# Patient Record
Sex: FEMALE | Race: OTHER
[De-identification: names, ages, dates, MRNs, and addresses within clinical notes are randomized per-mention and may not be internally consistent; named-entity substitution may affect disease eponyms.]

---

## 2020-06-11 ENCOUNTER — HOSPITAL ENCOUNTER (INPATIENT)
Dept: HOSPITAL 72 - EMR | Age: 35
LOS: 4 days | Discharge: HOME | DRG: 948 | End: 2020-06-15
Payer: COMMERCIAL

## 2020-06-11 VITALS — DIASTOLIC BLOOD PRESSURE: 70 MMHG | SYSTOLIC BLOOD PRESSURE: 113 MMHG

## 2020-06-11 VITALS — HEIGHT: 59 IN | WEIGHT: 110 LBS | BODY MASS INDEX: 22.18 KG/M2

## 2020-06-11 VITALS — DIASTOLIC BLOOD PRESSURE: 66 MMHG | SYSTOLIC BLOOD PRESSURE: 108 MMHG

## 2020-06-11 DIAGNOSIS — R00.1: ICD-10-CM

## 2020-06-11 DIAGNOSIS — R21: ICD-10-CM

## 2020-06-11 DIAGNOSIS — D64.9: ICD-10-CM

## 2020-06-11 DIAGNOSIS — G89.18: Primary | ICD-10-CM

## 2020-06-11 DIAGNOSIS — D25.9: ICD-10-CM

## 2020-06-11 DIAGNOSIS — L29.9: ICD-10-CM

## 2020-06-11 LAB
ADD MANUAL DIFF: NO
ALBUMIN SERPL-MCNC: 2.9 G/DL (ref 3.4–5)
ALBUMIN/GLOB SERPL: 0.9 {RATIO} (ref 1–2.7)
ALP SERPL-CCNC: 42 U/L (ref 46–116)
ALT SERPL-CCNC: 14 U/L (ref 12–78)
ANION GAP SERPL CALC-SCNC: 11 MMOL/L (ref 5–15)
APPEARANCE UR: CLEAR
APTT PPP: (no result) S
AST SERPL-CCNC: 14 U/L (ref 15–37)
BASOPHILS NFR BLD AUTO: 0.3 % (ref 0–2)
BILIRUB SERPL-MCNC: 0.6 MG/DL (ref 0.2–1)
BUN SERPL-MCNC: 5 MG/DL (ref 7–18)
CALCIUM SERPL-MCNC: 7.4 MG/DL (ref 8.5–10.1)
CHLORIDE SERPL-SCNC: 104 MMOL/L (ref 98–107)
CO2 SERPL-SCNC: 22 MMOL/L (ref 21–32)
CREAT SERPL-MCNC: 0.9 MG/DL (ref 0.55–1.3)
EOSINOPHIL NFR BLD AUTO: 0 % (ref 0–3)
ERYTHROCYTE [DISTWIDTH] IN BLOOD BY AUTOMATED COUNT: 13.6 % (ref 11.6–14.8)
GLOBULIN SER-MCNC: 3.1 G/DL
GLUCOSE UR STRIP-MCNC: NEGATIVE MG/DL
HCT VFR BLD CALC: 34.9 % (ref 37–47)
HGB BLD-MCNC: 10.9 G/DL (ref 12–16)
KETONES UR QL STRIP: (no result)
LEUKOCYTE ESTERASE UR QL STRIP: NEGATIVE
LYMPHOCYTES NFR BLD AUTO: 7.9 % (ref 20–45)
MCV RBC AUTO: 86 FL (ref 80–99)
MONOCYTES NFR BLD AUTO: 1.4 % (ref 1–10)
NEUTROPHILS NFR BLD AUTO: 90.4 % (ref 45–75)
NITRITE UR QL STRIP: NEGATIVE
PH UR STRIP: 7 [PH] (ref 4.5–8)
PLATELET # BLD: 251 K/UL (ref 150–450)
POTASSIUM SERPL-SCNC: 4 MMOL/L (ref 3.5–5.1)
PROT UR QL STRIP: NEGATIVE
RBC # BLD AUTO: 4.06 M/UL (ref 4.2–5.4)
SODIUM SERPL-SCNC: 136 MMOL/L (ref 136–145)
SP GR UR STRIP: 1.01 (ref 1–1.03)
UROBILINOGEN UR-MCNC: NORMAL MG/DL (ref 0–1)
WBC # BLD AUTO: 10.2 K/UL (ref 4.8–10.8)

## 2020-06-11 PROCEDURE — 81025 URINE PREGNANCY TEST: CPT

## 2020-06-11 PROCEDURE — 99285 EMERGENCY DEPT VISIT HI MDM: CPT

## 2020-06-11 PROCEDURE — 83690 ASSAY OF LIPASE: CPT

## 2020-06-11 PROCEDURE — 80053 COMPREHEN METABOLIC PANEL: CPT

## 2020-06-11 PROCEDURE — 96374 THER/PROPH/DIAG INJ IV PUSH: CPT

## 2020-06-11 PROCEDURE — 80048 BASIC METABOLIC PNL TOTAL CA: CPT

## 2020-06-11 PROCEDURE — 85025 COMPLETE CBC W/AUTO DIFF WBC: CPT

## 2020-06-11 PROCEDURE — 96375 TX/PRO/DX INJ NEW DRUG ADDON: CPT

## 2020-06-11 PROCEDURE — 96361 HYDRATE IV INFUSION ADD-ON: CPT

## 2020-06-11 PROCEDURE — 36415 COLL VENOUS BLD VENIPUNCTURE: CPT

## 2020-06-11 PROCEDURE — 82962 GLUCOSE BLOOD TEST: CPT

## 2020-06-11 PROCEDURE — 81003 URINALYSIS AUTO W/O SCOPE: CPT

## 2020-06-11 RX ADMIN — SODIUM CHLORIDE SCH MLS/HR: 900 INJECTION, SOLUTION INTRAVENOUS at 23:30

## 2020-06-11 NOTE — NUR
ED Nurse Note:



Recieved pt from surgery center, pt had removal of uterine fibroids and here 
for uncontrolled post-op pain, pt states pain at 10/10, tp took oral oxycodone 
and states made her feel worse, pt denies cp, sob, or any other complaints or 
discomforts, pt is ambualtory, has patent irwin and saline lock to left ac 
area, assessed and intact and patent, pt immeidately palced on cardiac 
monitoring, will resume care as ordered and closely monitor.

## 2020-06-11 NOTE — EMERGENCY ROOM REPORT
History of Present Illness


General


Chief Complaint:  Lower Back Pain or Injury


Source:  Patient





Present Illness


HPI


Disclaimer: Please note that this report is being documented using [employer] 

technology. This can lead to erroneous entry secondary to incorrect 

interpretation by the dictating instrument.





HPI: 35-year-old female history of uterine fibroids presents with postoperative 

pain.  She had a uterine artery ablation this morning with Dr. Alvarado.  After 

surgery patient had persistent pain and was sent to the ER for pain control.  

She denies any fevers nausea or vomiting.  Eyes any other medical history.  

Pain is in the lower abdomen radiating to the back approximately 8 out of 10.


 


PMH: Uterine fibroids


 


PSH: Reviewed


 


 Social Hx: Denies smoking drinking or illicit drug use


Allergies:  


Coded Allergies:  


     No Known Allergies (Unverified , 20)





COVID-19 Screening


Contact w/high risk pt:  No


Recent Travel to affected area:  No


Experienced COVID-19 symptoms?:  No


COVID-19 Testing performed PTA:  No - tested on 


COVID-19 Screening:  Negative COVID-19


COVID-19 Testing Source:  pre op





Patient History


Last Menstrual Period:  20


Pregnant Now:  No


:  0


Para:  0


Reviewed Nursing Documentation:  PMH: Agreed; PSxH: Agreed





Nursing Documentation-PMH


Past Medical History:  No Stated History





Review of Systems


All Other Systems:  negative except mentioned in HPI





Physical Exam





Vital Signs








  Date Time  Temp Pulse Resp B/P (MAP) Pulse Ox O2 Delivery O2 Flow Rate FiO2


 


20 19:07 99.1 53 20 101/63 (76) 100 Room Air  








Sp02 EP Interpretation:  reviewed, normal


General Appearance:  well appearing, no apparent distress


Head:  normocephalic, atraumatic


Eyes:  bilateral eye PERRL, bilateral eye EOMI


ENT:  hearing grossly normal, moist mucus membranes


Neck:  full range of motion, supple


Respiratory:  lungs clear, normal breath sounds, no rhonchi, no respiratory 

distress, no retraction, no wheezing


Cardiovascular #1:  normal peripheral pulses, regular rate, rhythm, no murmur


Gastrointestinal:  soft, non-distended, no guarding, tenderness - Lower 

abdominal tenderness without rebound or guarding


Neurologic:  alert, oriented x3, no focal defects


Skin:  normal color, warm/dry





Medical Decision Making


Diagnostic Impression:  


 Primary Impression:  


 Postoperative pain


 Additional Impression:  


 Uterine fibroid


ER Course


MDM: Differential included postoperative pain, uterine fibroids, less likely 

infectious process





Clinical course-IV already established at surgical center, IV fluids started, 

pain control given, basic laboratory studies are sent, will arrange for patient 

to be admitted to the medical floor for pain control and observation.





On reevaluation: Pain controlled patient sleeping comfortably








Plan-patient will be placed on observation on the medical floor





Last Vital Signs








  Date Time  Temp Pulse Resp B/P (MAP) Pulse Ox O2 Delivery O2 Flow Rate FiO2


 


20 19:07 99.1 53 20 101/63 (76) 100 Room Air  








Disposition:  ADMITTED AS INPATIENT


Condition:  Serious


Referrals:  


NOT CHOSEN IPA/MD,REFERRING (PCP)











Slim Mera M.D. 2020 19:31

## 2020-06-11 NOTE — NUR
ED Nurse Note:



Pt has hospital room for admission, pt is awake, alert and oriented x 4, 
resting quietly but when asked states remains in severe pain, pt appears drowsy 
with eyes low, does appear to be sedated, report called to floor nurse 
Jessica pt belonging list completed, med rec also, IV site patent, pt 
post-op site with no bleeding noted, all pulses are present, irwin to gravity 
with clear urine, pt being taken to floor via gurney with ER-Tech, nad noted 
during pt transport.

## 2020-06-11 NOTE — NUR
ED Nurse Note:



Pt resting in bed, appears to be sleeping, arouses easily to verbal stimuli, 
when awakened pt continues to c/o severe pain and asking for meds, MD aware, 
new order recieved, will re-medicate as ordered and closely monitor, pt waiting 
for room for admission. V/S stable, no SOB or labored breathing, will also 
prepare for hospital admission.

## 2020-06-12 VITALS — SYSTOLIC BLOOD PRESSURE: 99 MMHG | DIASTOLIC BLOOD PRESSURE: 61 MMHG

## 2020-06-12 VITALS — DIASTOLIC BLOOD PRESSURE: 63 MMHG | SYSTOLIC BLOOD PRESSURE: 94 MMHG

## 2020-06-12 VITALS — SYSTOLIC BLOOD PRESSURE: 100 MMHG | DIASTOLIC BLOOD PRESSURE: 57 MMHG

## 2020-06-12 VITALS — SYSTOLIC BLOOD PRESSURE: 96 MMHG | DIASTOLIC BLOOD PRESSURE: 52 MMHG

## 2020-06-12 VITALS — SYSTOLIC BLOOD PRESSURE: 114 MMHG | DIASTOLIC BLOOD PRESSURE: 70 MMHG

## 2020-06-12 VITALS — DIASTOLIC BLOOD PRESSURE: 77 MMHG | SYSTOLIC BLOOD PRESSURE: 98 MMHG

## 2020-06-12 LAB
ADD MANUAL DIFF: NO
ALBUMIN SERPL-MCNC: 3 G/DL (ref 3.4–5)
ALBUMIN/GLOB SERPL: 0.9 {RATIO} (ref 1–2.7)
ALP SERPL-CCNC: 41 U/L (ref 46–116)
ALT SERPL-CCNC: 14 U/L (ref 12–78)
ANION GAP SERPL CALC-SCNC: 8 MMOL/L (ref 5–15)
AST SERPL-CCNC: 14 U/L (ref 15–37)
BASOPHILS NFR BLD AUTO: 0.4 % (ref 0–2)
BILIRUB SERPL-MCNC: 0.7 MG/DL (ref 0.2–1)
BUN SERPL-MCNC: 5 MG/DL (ref 7–18)
CALCIUM SERPL-MCNC: 8.1 MG/DL (ref 8.5–10.1)
CHLORIDE SERPL-SCNC: 105 MMOL/L (ref 98–107)
CO2 SERPL-SCNC: 24 MMOL/L (ref 21–32)
CREAT SERPL-MCNC: 0.9 MG/DL (ref 0.55–1.3)
EOSINOPHIL NFR BLD AUTO: 0.1 % (ref 0–3)
ERYTHROCYTE [DISTWIDTH] IN BLOOD BY AUTOMATED COUNT: 11.7 % (ref 11.6–14.8)
GLOBULIN SER-MCNC: 3.4 G/DL
HCT VFR BLD CALC: 35.4 % (ref 37–47)
HGB BLD-MCNC: 11.5 G/DL (ref 12–16)
LYMPHOCYTES NFR BLD AUTO: 12 % (ref 20–45)
MCV RBC AUTO: 84 FL (ref 80–99)
MONOCYTES NFR BLD AUTO: 5.9 % (ref 1–10)
NEUTROPHILS NFR BLD AUTO: 81.7 % (ref 45–75)
PLATELET # BLD: 239 K/UL (ref 150–450)
POTASSIUM SERPL-SCNC: 4 MMOL/L (ref 3.5–5.1)
RBC # BLD AUTO: 4.24 M/UL (ref 4.2–5.4)
SODIUM SERPL-SCNC: 137 MMOL/L (ref 136–145)
WBC # BLD AUTO: 12 K/UL (ref 4.8–10.8)

## 2020-06-12 RX ADMIN — DIPHENHYDRAMINE HYDROCHLORIDE PRN MG: 50 INJECTION INTRAMUSCULAR; INTRAVENOUS at 10:15

## 2020-06-12 RX ADMIN — SODIUM CHLORIDE SCH MLS/HR: 900 INJECTION, SOLUTION INTRAVENOUS at 22:12

## 2020-06-12 RX ADMIN — SODIUM CHLORIDE SCH MLS/HR: 900 INJECTION, SOLUTION INTRAVENOUS at 12:58

## 2020-06-12 RX ADMIN — SODIUM CHLORIDE SCH MLS/HR: 900 INJECTION, SOLUTION INTRAVENOUS at 18:00

## 2020-06-12 RX ADMIN — DIPHENHYDRAMINE HYDROCHLORIDE PRN MG: 50 INJECTION INTRAMUSCULAR; INTRAVENOUS at 18:07

## 2020-06-12 RX ADMIN — DOCUSATE SODIUM SCH MG: 100 CAPSULE, LIQUID FILLED ORAL at 18:06

## 2020-06-12 RX ADMIN — DIPHENHYDRAMINE HYDROCHLORIDE PRN MG: 50 INJECTION INTRAMUSCULAR; INTRAVENOUS at 19:45

## 2020-06-12 RX ADMIN — SODIUM CHLORIDE SCH MLS/HR: 900 INJECTION, SOLUTION INTRAVENOUS at 11:41

## 2020-06-12 NOTE — HISTORY AND PHYSICAL
Flor Hamlin NP 6/12/20 0742:


History of Present Illness


General


Date patient seen:  Jun 12, 2020


Time patient seen:  06:30


Reason for Hospitalization:  post op ab pain





Present Illness


HPI


35 years old female with past medical history of uterine fibroids, status post 

uterine artery embolization, no significant other medical history, presented 

with persistent abdominal pain, lower radiating to the back and rated as 8 out 

of 10 on a scale 1-10.  Patient was sent for pain control.


She denied fever or chills.


She denied nausea and vomiting.  


Vital signs revealed bradycardia with   heart rate in 50.  No fevers.  


Laboratory work-up revealed no leukocytosis, hemoglobin 10.9, hematocrit 34.9 ,

platelet count 251.  


Stable electrolytes.  


Glucose 121.  


Stable LFT and  lipase.


Urinalysis revealed no evidence of urinary tract infection, +4 ketones.  


In emergency department patient received analgesic, antiemetic, started on IV 

fluids and admitted for further management.


This am patient has mild leukocytosis, no fevers. 


She complains of pruritic rash, started last night in ED. 


Pain overall  better controlled.


Allergies:  


Coded Allergies:  


     No Known Allergies (Unverified , 6/11/20)





COVID-19 Screening


Contact w/high risk pt:  No


Recent Travel to affected area:  No


Experienced COVID-19 symptoms?:  No





Medication History


Scheduled


Amoxicillin/Potassium Clav 875-125 Mg Tab* (Amox Tr-K Clv 875-125 Mg Tab*), 1 

TAB ORAL EVERY 12 HOURS, (Reported)





Scheduled PRN


Ondansetron Odt* (Zofran Odt*), 4 MG ORAL Q6H PRN for Nausea & Vomiting, (

Reported)


Oxycodone Hcl* (Oxycodone Hcl*), 5 MG ORAL Q4H PRN for For Pain, (Reported)


Tramadol Hcl* (Ultram*), 50 MG ORAL Q4HR PRN for For Pain, (Reported)





Patient History


Healthcare decision maker





Resuscitation status





Advanced Directive on File








Review of Systems


Eye:  Reports: no symptoms


ENT:  Reports: no symptoms


Respiratory:  Reports: no symptoms


Cardiovascular:  Reports: no symptoms


Gastrointestinal:  Reports: no symptoms


Genitourinary:  Reports: see HPI


Musculoskeletal:  Reports: no symptoms


Skin:  Reports: see HPI, rash


Psychiatric:  Reports: no symptoms


Neurological:  Reports: no symptoms


Endocrine:  Reports: no symptoms


Hematologic/Lymphatic:  Reports: no symptoms





Physical Exam


General Appearance:  WD/WN, no apparent distress


Lines, tubes and drains:  peripheral


HEENT:  normocephalic, atraumatic, anicteric, mucous membranes moist, PERRL


Neck:  non-tender, normal alignment, supple


Respiratory/Chest:  lungs clear, normal breath sounds, no respiratory distress, 

no accessory muscle use


Cardiovascular/Chest:  normal rate


Abdomen:  normal bowel sounds, soft - mild tenderness lwoer abdomen,


Extremities:  normal range of motion, non-tender, no calf tenderness, normal 

capillary refill, no edema


Neurologic:  alert, oriented x 3, responsive


Musculoskeletal:  normal muscle bulk





Last 24 Hour Vital Signs








  Date Time  Temp Pulse Resp B/P (MAP) Pulse Ox O2 Delivery O2 Flow Rate FiO2


 


6/12/20 04:00 98.7 98 17 100/57 (71) 98   


 


6/12/20 00:59      Room Air  


 


6/12/20 00:00 98.2 77 16 98/77 (84) 97   


 


6/11/20 22:00 98.8 72 16 108/66 (80) 100   


 


6/11/20 22:00 99.1 72 16 108/66 100 Room Air  


 


6/11/20 20:08 99.1       


 


6/11/20 20:08 99.1       


 


6/11/20 19:50 99.1 90 16 113/70 100 Room Air  


 


6/11/20 19:45 99.1 90 16 113/70 100 Room Air  


 


6/11/20 19:07 99.1 53 20 101/63 (76) 100 Room Air  

















Intake and Output  


 


 6/11/20 6/12/20





 19:00 07:00


 


Intake Total  150 ml


 


Output Total  2400 ml


 


Balance  -2250 ml


 


  


 


Intake Oral  150 ml


 


Output Urine Total  2400 ml


 


# Bowel Movements  1











Laboratory Tests








Test


  6/11/20


22:00 6/11/20


22:05 6/12/20


05:05


 


Urine Color Pale yellow    


 


Urine Appearance Clear    


 


Urine pH 7 (4.5-8.0)    


 


Urine Specific Gravity


  1.010


(1.005-1.035) 


  


 


 


Urine Protein


  Negative


(NEGATIVE) 


  


 


 


Urine Glucose (UA)


  Negative


(NEGATIVE) 


  


 


 


Urine Ketones


  4+ (NEGATIVE)


H 


  


 


 


Urine Blood


  3+ (NEGATIVE)


H 


  


 


 


Urine Nitrite


  Negative


(NEGATIVE) 


  


 


 


Urine Bilirubin


  Negative


(NEGATIVE) 


  


 


 


Urine Urobilinogen


  Normal MG/DL


(0.0-1.0) 


  


 


 


Urine Leukocyte Esterase


  Negative


(NEGATIVE) 


  


 


 


Urine RBC


  5-10 /HPF (0 -


2)  H 


  


 


 


Urine WBC


  0-2 /HPF (0 -


2) 


  


 


 


Urine Squamous Epithelial


Cells Occasional


/LPF 


  


 


 


Urine Bacteria


  None /HPF


(NONE) 


  


 


 


Urine HCG, Qualitative


  Negative


(NEGATIVE) 


  


 


 


White Blood Count


  


  10.2 K/UL


(4.8-10.8) 12.0 K/UL


(4.8-10.8)  H


 


Red Blood Count


  


  4.06 M/UL


(4.20-5.40)  L 4.24 M/UL


(4.20-5.40)


 


Hemoglobin


  


  10.9 G/DL


(12.0-16.0)  L 11.5 G/DL


(12.0-16.0)  L


 


Hematocrit


  


  34.9 %


(37.0-47.0)  L 35.4 %


(37.0-47.0)  L


 


Mean Corpuscular Volume  86 FL (80-99)   84 FL (80-99)  


 


Mean Corpuscular Hemoglobin


  


  26.8 PG


(27.0-31.0)  L 27.2 PG


(27.0-31.0)


 


Mean Corpuscular Hemoglobin


Concent 


  31.1 G/DL


(32.0-36.0)  L 32.5 G/DL


(32.0-36.0)


 


Red Cell Distribution Width


  


  13.6 %


(11.6-14.8) 11.7 %


(11.6-14.8)


 


Platelet Count


  


  251 K/UL


(150-450) 239 K/UL


(150-450)


 


Mean Platelet Volume


  


  7.6 FL


(6.5-10.1) 8.0 FL


(6.5-10.1)


 


Neutrophils (%) (Auto)


  


  90.4 %


(45.0-75.0)  H 81.7 %


(45.0-75.0)  H


 


Lymphocytes (%) (Auto)


  


  7.9 %


(20.0-45.0)  L 12.0 %


(20.0-45.0)  L


 


Monocytes (%) (Auto)


  


  1.4 %


(1.0-10.0) 5.9 %


(1.0-10.0)


 


Eosinophils (%) (Auto)


  


  0.0 %


(0.0-3.0) 0.1 %


(0.0-3.0)


 


Basophils (%) (Auto)


  


  0.3 %


(0.0-2.0) 0.4 %


(0.0-2.0)


 


Sodium Level


  


  136 MMOL/L


(136-145) 137 MMOL/L


(136-145)


 


Potassium Level


  


  4.0 MMOL/L


(3.5-5.1) 4.0 MMOL/L


(3.5-5.1)


 


Chloride Level


  


  104 MMOL/L


() 105 MMOL/L


()


 


Carbon Dioxide Level


  


  22 MMOL/L


(21-32) 24 MMOL/L


(21-32)


 


Anion Gap


  


  11 mmol/L


(5-15) 8 mmol/L


(5-15)


 


Blood Urea Nitrogen


  


  5 mg/dL (7-18)


L 5 mg/dL (7-18)


L


 


Creatinine


  


  0.9 MG/DL


(0.55-1.30) 0.9 MG/DL


(0.55-1.30)


 


Estimat Glomerular Filtration


Rate 


  > 60 mL/min


(>60) > 60 mL/min


(>60)


 


Glucose Level


  


  121 MG/DL


()  H 157 MG/DL


()  H


 


Calcium Level


  


  7.4 MG/DL


(8.5-10.1)  L 8.1 MG/DL


(8.5-10.1)  L


 


Total Bilirubin


  


  0.6 MG/DL


(0.2-1.0) 0.7 MG/DL


(0.2-1.0)


 


Aspartate Amino Transf


(AST/SGOT) 


  14 U/L (15-37)


L 14 U/L (15-37)


L


 


Alanine Aminotransferase


(ALT/SGPT) 


  14 U/L (12-78)


  14 U/L (12-78)


 


 


Alkaline Phosphatase


  


  42 U/L


()  L 41 U/L


()  L


 


Total Protein


  


  6.0 G/DL


(6.4-8.2)  L 6.4 G/DL


(6.4-8.2)


 


Albumin


  


  2.9 G/DL


(3.4-5.0)  L 3.0 G/DL


(3.4-5.0)  L


 


Globulin  3.1 g/dL   3.4 g/dL  


 


Albumin/Globulin Ratio


  


  0.9 (1.0-2.7)


L 0.9 (1.0-2.7)


L


 


Lipase


  


  68 U/L


()  L 


 








Height (Feet):  4


Height (Inches):  11.00


Weight (Pounds):  110


Medications





Current Medications








 Medications


  (Trade)  Dose


 Ordered  Sig/Farhana


 Route


 PRN Reason  Start Time


 Stop Time Status Last Admin


Dose Admin


 


 Cefazolin Sodium


 1 gm/Dextrose  55 ml @ 


 110 mls/hr  Q8HR


 IVPB


   6/12/20 06:00


 6/19/20 05:59  6/12/20 06:13


 


 


 Hydromorphone HCl


  (Dilaudid)  1 mg  Q6H  PRN


 IVP


 FOR MILD PAIN (1-3)  6/12/20 00:30


 6/19/20 00:29   


 


 


 Hydromorphone HCl


  (Dilaudid)  2 mg  Q2H  PRN


 IVP


 SEVERE PAIN (7-10)  6/11/20 23:15


 6/18/20 23:14  6/11/20 23:55


 


 


 Hydromorphone HCl


  (Dilaudid)  2 mg  Q3H  PRN


 IVP


 MODERATE PAIN (4-6)  6/12/20 00:30


 6/19/20 00:29   


 


 


 Ondansetron HCl


  (Zofran)  4 mg  Q6H  PRN


 IVP


 Nausea & Vomiting  6/11/20 23:15


 7/11/20 23:14   


 


 


 Potassium


 Chloride 20 meq/


 Dextrose/Lactated


 Ringer's  1,010 ml @ 


 150 mls/hr  Q6H44M


 IV


   6/11/20 23:30


 7/11/20 23:29  6/11/20 23:30


 











Assessment/Plan


Assessment/Plan:


ASSESSMENT


Postoperative pain


s/p uterine artery embolization 


Uterine fibroids


Leukocytosis  


Pruritic rash 


Anemia


Bradycardia - asymptomatic. resolved


 


PLAN OF CARE


MS floor


IVF


empiric abx -Cefazolin, 


will dc  abx  due to rash -possible adverse effect 


 


pain management 


a/emetic prn  [id number] added for pruritis


monitor rash, maybe adverse effect from Dilaudid as well, if symptomatic 

consider change Dilaudid to Morphine 


monitor HH with goal to keep Hgb above 7 


bowel regimen


OOB and ambulate as tolerated


supportive care  





 case discussed and evaluated by supervising physician





Hermilo Beltran MD 6/12/20 4722:


History of Present Illness


General


Reason for Hospitalization:  post op ab pain





Present Illness


Allergies:  


Coded Allergies:  


     No Known Allergies (Unverified , 6/11/20)





Medication History


Scheduled


Amoxicillin/Potassium Clav 875-125 Mg Tab* (Amox Tr-K Clv 875-125 Mg Tab*), 1 

TAB ORAL EVERY 12 HOURS, (Reported)





Scheduled PRN


Ondansetron Odt* (Zofran Odt*), 4 MG ORAL Q6H PRN for Nausea & Vomiting, (

Reported)


Oxycodone Hcl* (Oxycodone Hcl*), 5 MG ORAL Q4H PRN for For Pain, (Reported)


Tramadol Hcl* (Ultram*), 50 MG ORAL Q4HR PRN for For Pain, (Reported)





Assessment/Plan


Assessment/Plan:


Patient seen and examined with NP. Agree with above A&P as it reflects our 

joint deliberations.











Flor Hamlin NP Jun 12, 2020 07:42


Hermilo Beltran MD Jun 12, 2020 16:25

## 2020-06-12 NOTE — NUR
NURSES NOTE:



Pt in bed, complains of pain 4/10 in lower abdominal area. 1mg Dilaudid administered IV 
push. Effective, tolerated well. No outward s/s of distress noted. Breathing is even on RA. 
Ryan removed. IV RAC in tact, no s/s of infection. All due meds will be given. Bed at 
lowest level, call light within reach. Pt will be monitored.

## 2020-06-12 NOTE — NUR
NURSES NOTE:



During AM rounds, a red rash around pt's collar bone, across chest, bilateral breast 
spanning down to her top of abdomen was noted. Pt states she is experiencing pruritus. 
Oncoming nurse aware and will f/u with orders. Upon admission, a call was placed to Dr. Islas for more admission orders; however, the request was declined and told pt will be 
seen in AM.

## 2020-06-12 NOTE — NUR
NURSE NOTES:

pt complained of a redness around the neck and arms , on assessment pt has redness all 
around upper chest and around bilateral breast, and Bilateral upper extremities, notified 
Md, whom  advise me to call back  at 0900 am Yes

## 2020-06-12 NOTE — NUR
*-* INSURANCE *-*



ALL CLINICALS AND REVIEWS HAVE BEEN FAXED TO:



[employer] 

AUTH#[id number]

P:

F:[phone]

## 2020-06-12 NOTE — NUR
NURSE NOTES:

Received report from Shandra  RN, rounds made pt awake a/ox4, breaths regular unlabored at 
RA,  c/o pain 2/10,  IVF on LT AC 22G patent asymptomatic, Ryan catheter intact and 
anchored, dressing on R groin clean intact,  bed in low locked position side rails up X 2, 
call light with in reach, will continue with plan of care

## 2020-06-12 NOTE — NUR
CASE MANAGEMENT: INITIAL REVIEW



35YR OLD FEMALE WHEELCHAIRED IN TO ER FROM HOME 



CC: SHARP FLANK PAIN; UTERINE ARTERY EMBOLIZATION



SI:POST OPERATIVE PAIN . S/P UTERINE FIBROID EMBOLIZATION 

99.1   53   20   101/63   100% ON RA

  CA+ 7.4   ALKP 42   ALBUMIN 2.9



IS:IVF NS BOLUS X1

IV MORPHINE SULFATE X1





\**: 3E MED SURG UNIT 



DCP: HOME WHEN STABLE 

PLAN:

CONTROL PAIN 





CASE MANAGEMENT: REVIEW



06/12/20



SI:POST OPERATIVE PAIN . S/P UTERINE FIBROID EMBOLIZATION 

98.4   56    21   96/52   97% ON RA

  CA+ 7.4   ALKP 42   ALBUMIN 2.9



IS:IV KCL/D5/LR @150ML/HR

IV DILAUDID Q6HR/PRN

IV BENADRYL Q3HR/PRN





\**: [facility] MED SURG UNIT 



DCP: HOME WHEN STABLE 

PLAN:

CONTROL PAIN 

START ON IV VENOFER 

DC  [place]

## 2020-06-12 NOTE — NUR
NURSES NOTE:



PT received from ER at approx 2200. Pt is A/OX4, answers all questions appropriately. VS 
within normal limits. No outward s/s of distress noted. Breathing is even and unlabored on 
RA. L AC IV in tact, hep locked. Ryan catheter in place, draining to gravity. Urine light 
yellow in color. No sediment noted. Head to toe assessment performed. Skin is clear and in 
tact. Dressing, R groin clean and dry. Pt will continue to be monitored.

## 2020-06-12 NOTE — GENERAL SURGERY PROGRESS NOTE
General Surgery-Progress Note


Subjective


Day of Surgery:  june 11


Procedure Performed


uterine fibroid embolization


Symptoms:  improved, voiding well, pain decreased





Objective





Last 24 Hour Vital Signs








  Date Time  Temp Pulse Resp B/P (MAP) Pulse Ox O2 Delivery O2 Flow Rate FiO2


 


6/12/20 08:22 98.4 56 21 96/52 (67) 97   


 


6/12/20 04:00 98.7 98 17 100/57 (71) 98   


 


6/12/20 00:59      Room Air  


 


6/12/20 00:00 98.2 77 16 98/77 (84) 97   


 


6/11/20 22:00 98.8 72 16 108/66 (80) 100   


 


6/11/20 22:00 99.1 72 16 108/66 100 Room Air  


 


6/11/20 20:08 99.1       


 


6/11/20 20:08 99.1       


 


6/11/20 19:50 99.1 90 16 113/70 100 Room Air  


 


6/11/20 19:45 99.1 90 16 113/70 100 Room Air  


 


6/11/20 19:07 99.1 53 20 101/63 (76) 100 Room Air  








I&O











Intake and Output  


 


 6/11/20 6/12/20





 19:00 07:00


 


Intake Total  150 ml


 


Output Total  2400 ml


 


Balance  -2250 ml


 


  


 


Intake Oral  150 ml


 


Output Urine Total  2400 ml


 


# Bowel Movements  1








Dressing:  dry


Wound:  clean


Drains:  none


Cardiovascular:  RSR


Respiratory:  clear


Abdomen:  soft, flat, tenderness, present bowel sounds


Extremities:  no edema, no tenderness, no cyanosis





Laboratory Tests








Test


  6/11/20


22:00 6/11/20


22:05 6/12/20


05:05


 


Urine Color Pale yellow    


 


Urine Appearance Clear    


 


Urine pH 7 (4.5-8.0)    


 


Urine Specific Gravity


  1.010


(1.005-1.035) 


  


 


 


Urine Protein


  Negative


(NEGATIVE) 


  


 


 


Urine Glucose (UA)


  Negative


(NEGATIVE) 


  


 


 


Urine Ketones


  4+ (NEGATIVE)


H 


  


 


 


Urine Blood


  3+ (NEGATIVE)


H 


  


 


 


Urine Nitrite


  Negative


(NEGATIVE) 


  


 


 


Urine Bilirubin


  Negative


(NEGATIVE) 


  


 


 


Urine Urobilinogen


  Normal MG/DL


(0.0-1.0) 


  


 


 


Urine Leukocyte Esterase


  Negative


(NEGATIVE) 


  


 


 


Urine RBC


  5-10 /HPF (0 -


2)  H 


  


 


 


Urine WBC


  0-2 /HPF (0 -


2) 


  


 


 


Urine Squamous Epithelial


Cells Occasional


/LPF 


  


 


 


Urine Bacteria


  None /HPF


(NONE) 


  


 


 


Urine HCG, Qualitative


  Negative


(NEGATIVE) 


  


 


 


White Blood Count


  


  10.2 K/UL


(4.8-10.8) 12.0 K/UL


(4.8-10.8)  H


 


Red Blood Count


  


  4.06 M/UL


(4.20-5.40)  L 4.24 M/UL


(4.20-5.40)


 


Hemoglobin


  


  10.9 G/DL


(12.0-16.0)  L 11.5 G/DL


(12.0-16.0)  L


 


Hematocrit


  


  34.9 %


(37.0-47.0)  L 35.4 %


(37.0-47.0)  L


 


Mean Corpuscular Volume  86 FL (80-99)   84 FL (80-99)  


 


Mean Corpuscular Hemoglobin


  


  26.8 PG


(27.0-31.0)  L 27.2 PG


(27.0-31.0)


 


Mean Corpuscular Hemoglobin


Concent 


  31.1 G/DL


(32.0-36.0)  L 32.5 G/DL


(32.0-36.0)


 


Red Cell Distribution Width


  


  13.6 %


(11.6-14.8) 11.7 %


(11.6-14.8)


 


Platelet Count


  


  251 K/UL


(150-450) 239 K/UL


(150-450)


 


Mean Platelet Volume


  


  7.6 FL


(6.5-10.1) 8.0 FL


(6.5-10.1)


 


Neutrophils (%) (Auto)


  


  90.4 %


(45.0-75.0)  H 81.7 %


(45.0-75.0)  H


 


Lymphocytes (%) (Auto)


  


  7.9 %


(20.0-45.0)  L 12.0 %


(20.0-45.0)  L


 


Monocytes (%) (Auto)


  


  1.4 %


(1.0-10.0) 5.9 %


(1.0-10.0)


 


Eosinophils (%) (Auto)


  


  0.0 %


(0.0-3.0) 0.1 %


(0.0-3.0)


 


Basophils (%) (Auto)


  


  0.3 %


(0.0-2.0) 0.4 %


(0.0-2.0)


 


Sodium Level


  


  136 MMOL/L


(136-145) 137 MMOL/L


(136-145)


 


Potassium Level


  


  4.0 MMOL/L


(3.5-5.1) 4.0 MMOL/L


(3.5-5.1)


 


Chloride Level


  


  104 MMOL/L


() 105 MMOL/L


()


 


Carbon Dioxide Level


  


  22 MMOL/L


(21-32) 24 MMOL/L


(21-32)


 


Anion Gap


  


  11 mmol/L


(5-15) 8 mmol/L


(5-15)


 


Blood Urea Nitrogen


  


  5 mg/dL (7-18)


L 5 mg/dL (7-18)


L


 


Creatinine


  


  0.9 MG/DL


(0.55-1.30) 0.9 MG/DL


(0.55-1.30)


 


Estimat Glomerular Filtration


Rate 


  > 60 mL/min


(>60) > 60 mL/min


(>60)


 


Glucose Level


  


  121 MG/DL


()  H 157 MG/DL


()  H


 


Calcium Level


  


  7.4 MG/DL


(8.5-10.1)  L 8.1 MG/DL


(8.5-10.1)  L


 


Total Bilirubin


  


  0.6 MG/DL


(0.2-1.0) 0.7 MG/DL


(0.2-1.0)


 


Aspartate Amino Transf


(AST/SGOT) 


  14 U/L (15-37)


L 14 U/L (15-37)


L


 


Alanine Aminotransferase


(ALT/SGPT) 


  14 U/L (12-78)


  14 U/L (12-78)


 


 


Alkaline Phosphatase


  


  42 U/L


()  L 41 U/L


()  L


 


Total Protein


  


  6.0 G/DL


(6.4-8.2)  L 6.4 G/DL


(6.4-8.2)


 


Albumin


  


  2.9 G/DL


(3.4-5.0)  L 3.0 G/DL


(3.4-5.0)  L


 


Globulin  3.1 g/dL   3.4 g/dL  


 


Albumin/Globulin Ratio


  


  0.9 (1.0-2.7)


L 0.9 (1.0-2.7)


L


 


Lipase


  


  68 U/L


()  L 


 








Additional Comments


post embolization pain, now under control





Plan


Additional Comments


continue current management











Lincoln Alvarado MD Jun 12, 2020 10:46

## 2020-06-13 VITALS — SYSTOLIC BLOOD PRESSURE: 93 MMHG | DIASTOLIC BLOOD PRESSURE: 65 MMHG

## 2020-06-13 VITALS — SYSTOLIC BLOOD PRESSURE: 115 MMHG | DIASTOLIC BLOOD PRESSURE: 84 MMHG

## 2020-06-13 VITALS — DIASTOLIC BLOOD PRESSURE: 76 MMHG | SYSTOLIC BLOOD PRESSURE: 99 MMHG

## 2020-06-13 VITALS — DIASTOLIC BLOOD PRESSURE: 58 MMHG | SYSTOLIC BLOOD PRESSURE: 91 MMHG

## 2020-06-13 VITALS — DIASTOLIC BLOOD PRESSURE: 69 MMHG | SYSTOLIC BLOOD PRESSURE: 109 MMHG

## 2020-06-13 VITALS — DIASTOLIC BLOOD PRESSURE: 54 MMHG | SYSTOLIC BLOOD PRESSURE: 104 MMHG

## 2020-06-13 LAB
ADD MANUAL DIFF: NO
ALBUMIN SERPL-MCNC: 3 G/DL (ref 3.4–5)
ALBUMIN/GLOB SERPL: 0.9 {RATIO} (ref 1–2.7)
ALP SERPL-CCNC: 40 U/L (ref 46–116)
ALT SERPL-CCNC: 12 U/L (ref 12–78)
ANION GAP SERPL CALC-SCNC: 7 MMOL/L (ref 5–15)
AST SERPL-CCNC: 15 U/L (ref 15–37)
BASOPHILS NFR BLD AUTO: 0.7 % (ref 0–2)
BILIRUB SERPL-MCNC: 0.6 MG/DL (ref 0.2–1)
BUN SERPL-MCNC: 3 MG/DL (ref 7–18)
CALCIUM SERPL-MCNC: 8.1 MG/DL (ref 8.5–10.1)
CHLORIDE SERPL-SCNC: 103 MMOL/L (ref 98–107)
CO2 SERPL-SCNC: 26 MMOL/L (ref 21–32)
CREAT SERPL-MCNC: 0.9 MG/DL (ref 0.55–1.3)
EOSINOPHIL NFR BLD AUTO: 3.7 % (ref 0–3)
ERYTHROCYTE [DISTWIDTH] IN BLOOD BY AUTOMATED COUNT: 11.9 % (ref 11.6–14.8)
GLOBULIN SER-MCNC: 3.3 G/DL
HCT VFR BLD CALC: 37.8 % (ref 37–47)
HGB BLD-MCNC: 11.9 G/DL (ref 12–16)
LYMPHOCYTES NFR BLD AUTO: 13.1 % (ref 20–45)
MCV RBC AUTO: 85 FL (ref 80–99)
MONOCYTES NFR BLD AUTO: 5.4 % (ref 1–10)
NEUTROPHILS NFR BLD AUTO: 77.1 % (ref 45–75)
PLATELET # BLD: 226 K/UL (ref 150–450)
POTASSIUM SERPL-SCNC: 3.9 MMOL/L (ref 3.5–5.1)
RBC # BLD AUTO: 4.48 M/UL (ref 4.2–5.4)
SODIUM SERPL-SCNC: 136 MMOL/L (ref 136–145)
WBC # BLD AUTO: 10.4 K/UL (ref 4.8–10.8)

## 2020-06-13 RX ADMIN — DIPHENHYDRAMINE HYDROCHLORIDE PRN MG: 50 INJECTION INTRAMUSCULAR; INTRAVENOUS at 06:22

## 2020-06-13 RX ADMIN — MORPHINE SULFATE PRN MG: 4 INJECTION INTRAVENOUS at 17:40

## 2020-06-13 RX ADMIN — DOCUSATE SODIUM SCH MG: 100 CAPSULE, LIQUID FILLED ORAL at 08:59

## 2020-06-13 RX ADMIN — MORPHINE SULFATE PRN MG: 4 INJECTION INTRAVENOUS at 13:38

## 2020-06-13 RX ADMIN — SODIUM CHLORIDE SCH MLS/HR: 900 INJECTION, SOLUTION INTRAVENOUS at 22:11

## 2020-06-13 RX ADMIN — DIPHENHYDRAMINE HYDROCHLORIDE PRN MG: 50 INJECTION INTRAMUSCULAR; INTRAVENOUS at 06:31

## 2020-06-13 RX ADMIN — SODIUM CHLORIDE SCH MLS/HR: 900 INJECTION, SOLUTION INTRAVENOUS at 13:38

## 2020-06-13 RX ADMIN — DOCUSATE SODIUM SCH MG: 100 CAPSULE, LIQUID FILLED ORAL at 16:59

## 2020-06-13 RX ADMIN — MORPHINE SULFATE PRN MG: 4 INJECTION INTRAVENOUS at 10:01

## 2020-06-13 RX ADMIN — SODIUM CHLORIDE SCH MLS/HR: 900 INJECTION, SOLUTION INTRAVENOUS at 06:16

## 2020-06-13 NOTE — NUR
NURSE NOTES:

Received report from Shandra HUERTA, rounds made pt awake a/ox4, breaths regular unlabored at 
RA,  denies pain at this time,  IVF on LT AC 22G patent asymptomatic, dressing on R groin 
clean intact,  bed in low locked position side rails up X 2, call light with in reach, will 
continue with plan of care

## 2020-06-13 NOTE — GENERAL SURGERY PROGRESS NOTE
General Surgery-Progress Note


Subjective


Procedure Performed


uterine fibroid embolization


Symptoms:  improved, tolerating diet, voiding well, passing flatus





Objective





Last 24 Hour Vital Signs








  Date Time  Temp Pulse Resp B/P (MAP) Pulse Ox O2 Delivery O2 Flow Rate FiO2


 


6/13/20 12:00 98.3 78 20 115/84 (94) 99   


 


6/13/20 09:00      Room Air  


 


6/13/20 04:00 99.5 72 19 99/76 (84) 94   


 


6/13/20 00:00 99.3 63 19 91/58 (69) 99   


 


6/12/20 21:00      Room Air  


 


6/12/20 20:00 98.5 80 20 99/61 (74) 99   


 


6/12/20 16:32 98.8 69 20 114/70 (85) 97   








I&O











Intake and Output  


 


 6/12/20 6/13/20





 19:00 07:00


 


Intake Total 480 ml 


 


Balance 480 ml 


 


  


 


Intake Oral 480 ml 


 


# Voids 3 4


 


# Bowel Movements 1 








Dressing:  dry


Wound:  clean


Drains:  none


Cardiovascular:  RSR


Respiratory:  clear


Abdomen:  soft, flat, scaphoid, tenderness, present bowel sounds


Extremities:  no edema, no tenderness, no cyanosis





Laboratory Tests








Test


  6/13/20


05:00


 


White Blood Count


  10.4 K/UL


(4.8-10.8)


 


Red Blood Count


  4.48 M/UL


(4.20-5.40)


 


Hemoglobin


  11.9 G/DL


(12.0-16.0)  L


 


Hematocrit


  37.8 %


(37.0-47.0)


 


Mean Corpuscular Volume 85 FL (80-99)  


 


Mean Corpuscular Hemoglobin


  26.6 PG


(27.0-31.0)  L


 


Mean Corpuscular Hemoglobin


Concent 31.4 G/DL


(32.0-36.0)  L


 


Red Cell Distribution Width


  11.9 %


(11.6-14.8)


 


Platelet Count


  226 K/UL


(150-450)


 


Mean Platelet Volume


  8.0 FL


(6.5-10.1)


 


Neutrophils (%) (Auto)


  77.1 %


(45.0-75.0)  H


 


Lymphocytes (%) (Auto)


  13.1 %


(20.0-45.0)  L


 


Monocytes (%) (Auto)


  5.4 %


(1.0-10.0)


 


Eosinophils (%) (Auto)


  3.7 %


(0.0-3.0)  H


 


Basophils (%) (Auto)


  0.7 %


(0.0-2.0)


 


Sodium Level


  136 MMOL/L


(136-145)


 


Potassium Level


  3.9 MMOL/L


(3.5-5.1)


 


Chloride Level


  103 MMOL/L


()


 


Carbon Dioxide Level


  26 MMOL/L


(21-32)


 


Anion Gap


  7 mmol/L


(5-15)


 


Blood Urea Nitrogen


  3 mg/dL (7-18)


L


 


Creatinine


  0.9 MG/DL


(0.55-1.30)


 


Estimat Glomerular Filtration


Rate > 60 mL/min


(>60)


 


Glucose Level


  126 MG/DL


()  H


 


Calcium Level


  8.1 MG/DL


(8.5-10.1)  L


 


Total Bilirubin


  0.6 MG/DL


(0.2-1.0)


 


Aspartate Amino Transf


(AST/SGOT) 15 U/L (15-37)


 


 


Alanine Aminotransferase


(ALT/SGPT) 12 U/L (12-78)


 


 


Alkaline Phosphatase


  40 U/L


()  L


 


Total Protein


  6.3 G/DL


(6.4-8.2)  L


 


Albumin


  3.0 G/DL


(3.4-5.0)  L


 


Globulin 3.3 g/dL  


 


Albumin/Globulin Ratio


  0.9 (1.0-2.7)


L











Plan


Additional Comments


attempt oral meds for analgesia. ambulate, soft diet. stop dilaudid, 

antibiotics.











Lincoln Alvarado MD Jun 13, 2020 14:37

## 2020-06-13 NOTE — NUR
NURSE NOTES:

Received report from BRADLEY Lynch. Pt is awake, lying semi-hoffman's; comfortably resting. No 
signs of acute distress noted. Pt denies any pain at this time. AOx4; able to make needs 
known. Checked IV site, line and rate; patent and running. No erythema, bleeding, or 
infiltration noted. Bed at lowest position. Brakes on. Siderails up x3. Call light within 
reach. Will continue to monitor.

## 2020-06-13 NOTE — PULMONOLOGY PROGRESS NOTE
Hamlin ,Flor NP 6/13/20 0813:


Subjective


Allergies:  


Coded Allergies:  


     No Known Allergies (Unverified , 6/11/20)


Subjective


Mild leukocytosis resolved


Feeling better 


Rash now on  upper  back as well





Objective





Last 24 Hour Vital Signs








  Date Time  Temp Pulse Resp B/P (MAP) Pulse Ox O2 Delivery O2 Flow Rate FiO2


 


6/13/20 04:00 99.5 72 19 99/76 (84) 94   


 


6/13/20 00:00 99.3 63 19 91/58 (69) 99   


 


6/12/20 21:00      Room Air  


 


6/12/20 20:00 98.5 80 20 99/61 (74) 99   


 


6/12/20 16:32 98.8 69 20 114/70 (85) 97   


 


6/12/20 12:00 98.4 72 19 94/63 (73) 95   


 


6/12/20 09:00      Room Air  


 


6/12/20 08:22 98.4 56 21 96/52 (67) 97   

















Intake and Output  


 


 6/12/20 6/13/20





 19:00 07:00


 


Intake Total 480 ml 


 


Balance 480 ml 


 


  


 


Intake Oral 480 ml 


 


# Voids 3 4


 


# Bowel Movements 1 








Objective


General Appearance:  WD/WN, no apparent distress


Lines, tubes and drains:  peripheral


HEENT:  normocephalic, atraumatic, anicteric, mucous membranes moist, PERRL


Neck:  non-tender, normal alignment, supple


Respiratory/Chest:  lungs clear, normal breath sounds, no respiratory distress, 

no accessory muscle use


Cardiovascular/Chest:  normal rate


Abdomen:  normal bowel sounds, soft - mild tenderness lwoer abdomen,


Extremities:  normal range of motion, non-tender, no calf tenderness, normal 

capillary refill, no edema


Neurologic:  alert, oriented x 3, responsive


Musculoskeletal:  normal muscle bulk 


SKIN: maculopapular rash upper chest and upper back


Laboratory Tests


6/13/20 05:00: 


White Blood Count 10.4, Red Blood Count 4.48, Hemoglobin 11.9L, Hematocrit 37.8

, Mean Corpuscular Volume 85, Mean Corpuscular Hemoglobin 26.6L, Mean 

Corpuscular Hemoglobin Concent 31.4L, Red Cell Distribution Width 11.9, 

Platelet Count 226, Mean Platelet Volume 8.0, Neutrophils (%) (Auto) 77.1H, 

Lymphocytes (%) (Auto) 13.1L, Monocytes (%) (Auto) 5.4, Eosinophils (%) (Auto) 

3.7H, Basophils (%) (Auto) 0.7, Sodium Level 136, Potassium Level 3.9, Chloride 

Level 103, Carbon Dioxide Level 26, Anion Gap 7, Blood Urea Nitrogen 3L, 

Creatinine 0.9, Estimat Glomerular Filtration Rate > 60, Glucose Level 126H, 

Calcium Level 8.1L, Total Bilirubin 0.6, Aspartate Amino Transf (AST/SGOT) 15, 

Alanine Aminotransferase (ALT/SGPT) 12, Alkaline Phosphatase 40L, Total Protein 

6.3L, Albumin 3.0L, Globulin 3.3, Albumin/Globulin Ratio 0.9L





Current Medications








 Medications


  (Trade)  Dose


 Ordered  Sig/Farhana


 Route


 PRN Reason  Start Time


 Stop Time Status Last Admin


Dose Admin


 


 Bisacodyl


  (Dulcolax)  5 mg  DAILYPRN  PRN


 ORAL


 Constipation  6/12/20 16:30


 9/10/20 16:29   


 


 


 Bisacodyl


  (Dulcolax)  10 mg  DAILYPRN  PRN


 RECTAL


 Constipation  6/12/20 16:30


 9/10/20 16:29   


 


 


 Diphenhydramine


 HCl


  (Benadryl)  25 mg  Q3H  PRN


 IVP


 Itching  6/12/20 10:00


 7/12/20 09:59  6/13/20 06:31


 


 


 Docusate Sodium


  (Colace)  100 mg  TWICE A  DAY


 ORAL


   6/12/20 18:00


 7/12/20 17:59  6/12/20 18:06


 


 


 Hydromorphone HCl


  (Dilaudid)  1 mg  Q6H  PRN


 IVP


 FOR MILD PAIN (1-3)  6/12/20 00:30


 6/19/20 00:29  6/13/20 06:22


 


 


 Hydromorphone HCl


  (Dilaudid)  2 mg  Q2H  PRN


 IVP


 SEVERE PAIN (7-10)  6/11/20 23:15


 6/18/20 23:14  6/11/20 23:55


 


 


 Hydromorphone HCl


  (Dilaudid)  2 mg  Q3H  PRN


 IVP


 MODERATE PAIN (4-6)  6/12/20 00:30


 6/19/20 00:29  6/13/20 01:53


 


 


 Ondansetron HCl


  (Zofran)  4 mg  Q6H  PRN


 IVP


 Nausea & Vomiting  6/11/20 23:15


 7/11/20 23:14   


 


 


 Potassium


 Chloride 20 meq/


 Dextrose/Lactated


 Ringer's  1,010 ml @ 


 150 mls/hr  Q6H44M


 IV


   6/12/20 18:00


 7/12/20 17:59  6/13/20 06:16


 











Assessment/Plan


Assessment/Plan


ASSESSMENT


Postoperative pain


s/p uterine artery embolization 


Uterine fibroids


Leukocytosis  


Pruritic rash 


Anemia


Bradycardia - asymptomatic. resolved


 


PLAN OF CARE


MS floor


IVF


initially empiric abx -Cefazolin, 


abx stopped due to rash -possible adverse effect 


mild leukocytosis resolved 





pain management 


a/emetic prn  


Benadryl added for pruritis


advance diet to soft as tolerated 


rash persist, now on back as well, 





will dc Dilaudid and contact surgeon if want to switch to oral analgesics


possibly  adverse effect from Dilaudid as well, 


monitor HH with goal to keep Hgb above 7 


bowel regimen


OOB and ambulate as tolerated


supportive care  





 case discussed and evaluated by supervising physician





Hermilo Beltran MD 6/13/20 1553:


Subjective


Allergies:  


Coded Allergies:  


     No Known Allergies (Unverified , 6/11/20)





Assessment/Plan


Assessment/Plan


Patient seen and examined with NP. Agree with above A&P as it reflects our 

joint deliberations.





Transition to oral pain meds.  More aggressive bowel regimen.  PO as tolerated.











Flor Hamlin NP Jun 13, 2020 08:13


Hermilo Beltran MD Jun 13, 2020 15:53

## 2020-06-14 VITALS — DIASTOLIC BLOOD PRESSURE: 69 MMHG | SYSTOLIC BLOOD PRESSURE: 104 MMHG

## 2020-06-14 VITALS — SYSTOLIC BLOOD PRESSURE: 98 MMHG | DIASTOLIC BLOOD PRESSURE: 61 MMHG

## 2020-06-14 VITALS — DIASTOLIC BLOOD PRESSURE: 58 MMHG | SYSTOLIC BLOOD PRESSURE: 100 MMHG

## 2020-06-14 VITALS — DIASTOLIC BLOOD PRESSURE: 59 MMHG | SYSTOLIC BLOOD PRESSURE: 98 MMHG

## 2020-06-14 VITALS — SYSTOLIC BLOOD PRESSURE: 93 MMHG | DIASTOLIC BLOOD PRESSURE: 62 MMHG

## 2020-06-14 LAB
ADD MANUAL DIFF: NO
ALBUMIN SERPL-MCNC: 3.3 G/DL (ref 3.4–5)
ALBUMIN/GLOB SERPL: 0.9 {RATIO} (ref 1–2.7)
ALP SERPL-CCNC: 52 U/L (ref 46–116)
ALT SERPL-CCNC: 19 U/L (ref 12–78)
ANION GAP SERPL CALC-SCNC: 9 MMOL/L (ref 5–15)
AST SERPL-CCNC: 21 U/L (ref 15–37)
BASOPHILS NFR BLD AUTO: 0.4 % (ref 0–2)
BILIRUB SERPL-MCNC: 1 MG/DL (ref 0.2–1)
BUN SERPL-MCNC: 2 MG/DL (ref 7–18)
CALCIUM SERPL-MCNC: 8.5 MG/DL (ref 8.5–10.1)
CHLORIDE SERPL-SCNC: 98 MMOL/L (ref 98–107)
CO2 SERPL-SCNC: 28 MMOL/L (ref 21–32)
CREAT SERPL-MCNC: 0.9 MG/DL (ref 0.55–1.3)
EOSINOPHIL NFR BLD AUTO: 2.9 % (ref 0–3)
ERYTHROCYTE [DISTWIDTH] IN BLOOD BY AUTOMATED COUNT: 13.5 % (ref 11.6–14.8)
GLOBULIN SER-MCNC: 3.8 G/DL
HCT VFR BLD CALC: 41.1 % (ref 37–47)
HGB BLD-MCNC: 12.6 G/DL (ref 12–16)
LYMPHOCYTES NFR BLD AUTO: 11.1 % (ref 20–45)
MCV RBC AUTO: 88 FL (ref 80–99)
MONOCYTES NFR BLD AUTO: 4.9 % (ref 1–10)
NEUTROPHILS NFR BLD AUTO: 80.7 % (ref 45–75)
PLATELET # BLD: 278 K/UL (ref 150–450)
POTASSIUM SERPL-SCNC: 3.4 MMOL/L (ref 3.5–5.1)
RBC # BLD AUTO: 4.69 M/UL (ref 4.2–5.4)
SODIUM SERPL-SCNC: 135 MMOL/L (ref 136–145)
WBC # BLD AUTO: 14 K/UL (ref 4.8–10.8)

## 2020-06-14 RX ADMIN — DOCUSATE SODIUM SCH MG: 100 CAPSULE, LIQUID FILLED ORAL at 17:35

## 2020-06-14 RX ADMIN — SODIUM CHLORIDE SCH MLS/HR: 900 INJECTION, SOLUTION INTRAVENOUS at 09:00

## 2020-06-14 RX ADMIN — SODIUM CHLORIDE SCH MLS/HR: 900 INJECTION, SOLUTION INTRAVENOUS at 17:35

## 2020-06-14 RX ADMIN — AMOXICILLIN AND CLAVULANATE POTASSIUM SCH MG: 875; 125 TABLET, FILM COATED ORAL at 10:44

## 2020-06-14 RX ADMIN — DOCUSATE SODIUM SCH MG: 100 CAPSULE, LIQUID FILLED ORAL at 08:59

## 2020-06-14 RX ADMIN — AMOXICILLIN AND CLAVULANATE POTASSIUM SCH MG: 875; 125 TABLET, FILM COATED ORAL at 20:42

## 2020-06-14 NOTE — NUR
NURSE NOTES:

Received report from Zaina HUERTA. Patient is asleep during rounds, in no apparent distress, 
RR even and unlabored, IVF running per order. Side rails upx2, bed low and locked, call 
light within reach.

## 2020-06-14 NOTE — NUR
NURSE NOTES:

Received order from GET Hamlin NP to advance patient's diet to soft diet and encourage IS. 
Orders read back and entered.

## 2020-06-14 NOTE — NUR
NURSE NOTES:

Received callback from Dr. Beltran. MD aware of patient's fever. Tylenol order received, read 
back and entered. Will carry out.

## 2020-06-14 NOTE — PULMONOLOGY PROGRESS NOTE
Flor Hamlin NP 6/14/20 0803:


Subjective


Allergies:  


Coded Allergies:  


     No Known Allergies (Unverified , 6/11/20)


Subjective


fever 101.2 this am  and WBC 14


rash resolved 


pain controlled with oral analgesics





Objective





Last 24 Hour Vital Signs








  Date Time  Temp Pulse Resp B/P (MAP) Pulse Ox O2 Delivery O2 Flow Rate FiO2


 


6/14/20 04:00 97.5 89 18 104/69 (81) 100   


 


6/13/20 23:45 97.9 77 20 93/65 (74) 96   


 


6/13/20 21:00      Room Air  


 


6/13/20 20:00 98.5 77 20 109/69 (82) 100   


 


6/13/20 16:00 97.5 71 18 104/54 (71) 99   


 


6/13/20 12:00 98.3 78 20 115/84 (94) 99   


 


6/13/20 09:00      Room Air  

















Intake and Output  


 


 6/13/20 6/14/20





 19:00 07:00


 


Intake Total 520 ml 500 ml


 


Balance 520 ml 500 ml


 


  


 


Intake Oral 520 ml 500 ml


 


# Voids 5 3








Objective


General Appearance:  WD/WN, no apparent distress


Lines, tubes and drains:  peripheral


HEENT:  normocephalic, atraumatic, anicteric, mucous membranes moist, PERRL


Neck:  non-tender, normal alignment, supple


Respiratory/Chest:  lungs clear, normal breath sounds, no respiratory distress, 

no accessory muscle use


Cardiovascular/Chest:  normal rate


Abdomen:  normal bowel sounds, soft - mild tenderness lwoer abdomen,


Extremities:  normal range of motion, non-tender, no calf tenderness, normal 

capillary refill, no edema


Neurologic:  alert, oriented x 3, responsive


Musculoskeletal:  normal muscle bulk 


SKIN: maculopapular rash upper chest and upper back





Current Medications








 Medications


  (Trade)  Dose


 Ordered  Sig/Farhana


 Route


 PRN Reason  Start Time


 Stop Time Status Last Admin


Dose Admin


 


 Bisacodyl


  (Dulcolax)  5 mg  DAILYPRN  PRN


 ORAL


 Constipation  6/12/20 16:30


 9/10/20 16:29  6/13/20 16:59


 


 


 Bisacodyl


  (Dulcolax)  10 mg  DAILYPRN  PRN


 RECTAL


 Constipation  6/12/20 16:30


 9/10/20 16:29   


 


 


 Diphenhydramine


 HCl


  (Benadryl)  25 mg  Q3H  PRN


 IVP


 Itching  6/12/20 10:00


 7/12/20 09:59  6/13/20 06:31


 


 


 Docusate Sodium


  (Colace)  100 mg  TWICE A  DAY


 ORAL


   6/12/20 18:00


 7/12/20 17:59  6/13/20 16:59


 


 


 Lactulose


  (Cephulac)  30 gm  TIDPRN  PRN


 ORAL


 CONSTIPATION  6/13/20 15:52


 7/13/20 15:51   


 


 


 Morphine Sulfate


  (Morphine


 Sulfate)  4 mg  Q4H  PRN


 IVP


 breakthrough pain  6/13/20 09:45


 6/20/20 09:44  6/13/20 17:40


 


 


 Ondansetron HCl


  (Zofran)  4 mg  Q6H  PRN


 IVP


 Nausea & Vomiting  6/11/20 23:15


 7/11/20 23:14   


 


 


 Oxycodone/


 Acetaminophen


  (Percocet 10/325)  1 tab  Q3H  PRN


 ORAL


 severe pain  6/13/20 09:45


 6/20/20 09:44  6/14/20 06:50


 


 


 Potassium


 Chloride 20 meq/


 Dextrose/Lactated


 Ringer's  1,010 ml @ 


 100 mls/hr  Q10H6M


 IV


   6/13/20 12:00


 7/13/20 11:59  6/13/20 22:11


 











Assessment/Plan


Assessment/Plan


ASSESSMENT


Postoperative pain


s/p uterine artery embolization 


Uterine fibroids


Leukocytosis  


Pruritic rash 


Anemia


Bradycardia - asymptomatic. resolved


 


PLAN OF CARE


MS floor


IVF


initially empiric abx -Cefazolin, 


abx stopped due to rash -possible adverse effect 


mild leukocytosis resolved ,this am WBC 14 and fever 101.2


start Augmentin


antipyretic prn


IS the bedside and encourage to use


OOB as tolerated 


pain management, toelrates po 


Dilaudid prior dc due to possible adverse effect due to rash 


no further rash  


a/emetic prn  


Benadryl added for pruritis prn 


advance diet  to soft ,  


 


 


bowel regimen, had BM


voiding w/out difficulties 


supportive care  


add 20 KL po ( has in IVF as well)





case discussed and evaluated by supervising physician





Hermilo Beltran MD 6/14/20 1342:


Subjective


Allergies:  


Coded Allergies:  


     No Known Allergies (Unverified , 6/11/20)





Assessment/Plan


Assessment/Plan


Patient seen and examined with NP. Agree with above A&P as it reflects our 

joint deliberations.











Flor Hamlin NP Jun 14, 2020 08:03


Hermilo Beltran MD Jun 14, 2020 13:42

## 2020-06-14 NOTE — NUR
NURSE NOTES:

Called and left voicemail with emily Wilson MD of patient's fever of 101.2. Awaiting 
callback.

## 2020-06-14 NOTE — NUR
nurse's notes: received patient awake, alert and oriented; no complaints of pain or any 
other distress; encouraged to use IS and increase water intake due to fevers during the day; 
education given too on pain management; plan of care discussed with patient; will continue 
to monitor.

## 2020-06-14 NOTE — NUR
HAND-OFF: 

Report given to BRADLEY Ortiz. Pt is awake and in stable condition. Plan of care endorsed.

## 2020-06-15 VITALS — DIASTOLIC BLOOD PRESSURE: 62 MMHG | SYSTOLIC BLOOD PRESSURE: 109 MMHG

## 2020-06-15 VITALS — DIASTOLIC BLOOD PRESSURE: 61 MMHG | SYSTOLIC BLOOD PRESSURE: 96 MMHG

## 2020-06-15 VITALS — DIASTOLIC BLOOD PRESSURE: 60 MMHG | SYSTOLIC BLOOD PRESSURE: 100 MMHG

## 2020-06-15 LAB
ADD MANUAL DIFF: NO
ANION GAP SERPL CALC-SCNC: 6 MMOL/L (ref 5–15)
BASOPHILS NFR BLD AUTO: 0.8 % (ref 0–2)
BUN SERPL-MCNC: 3 MG/DL (ref 7–18)
CALCIUM SERPL-MCNC: 8.2 MG/DL (ref 8.5–10.1)
CHLORIDE SERPL-SCNC: 103 MMOL/L (ref 98–107)
CO2 SERPL-SCNC: 29 MMOL/L (ref 21–32)
CREAT SERPL-MCNC: 0.9 MG/DL (ref 0.55–1.3)
EOSINOPHIL NFR BLD AUTO: 3.8 % (ref 0–3)
ERYTHROCYTE [DISTWIDTH] IN BLOOD BY AUTOMATED COUNT: 13.7 % (ref 11.6–14.8)
HCT VFR BLD CALC: 36.6 % (ref 37–47)
HGB BLD-MCNC: 11.4 G/DL (ref 12–16)
LYMPHOCYTES NFR BLD AUTO: 12.2 % (ref 20–45)
MCV RBC AUTO: 87 FL (ref 80–99)
MONOCYTES NFR BLD AUTO: 5.5 % (ref 1–10)
NEUTROPHILS NFR BLD AUTO: 77.7 % (ref 45–75)
PLATELET # BLD: 249 K/UL (ref 150–450)
POTASSIUM SERPL-SCNC: 3.8 MMOL/L (ref 3.5–5.1)
RBC # BLD AUTO: 4.21 M/UL (ref 4.2–5.4)
SODIUM SERPL-SCNC: 137 MMOL/L (ref 136–145)
WBC # BLD AUTO: 13.3 K/UL (ref 4.8–10.8)

## 2020-06-15 RX ADMIN — AMOXICILLIN AND CLAVULANATE POTASSIUM SCH MG: 875; 125 TABLET, FILM COATED ORAL at 09:10

## 2020-06-15 RX ADMIN — DOCUSATE SODIUM SCH MG: 100 CAPSULE, LIQUID FILLED ORAL at 09:10

## 2020-06-15 RX ADMIN — SODIUM CHLORIDE SCH MLS/HR: 900 INJECTION, SOLUTION INTRAVENOUS at 02:01

## 2020-06-15 NOTE — PULMONOLOGY PROGRESS NOTE
Subjective


Allergies:  


Coded Allergies:  


     No Known Allergies (Unverified , 6/11/20)


Subjective


low grade fever this am, currently afebrile


leuk trending down


pain controlled with oral analgesics


denies n/v/


had BM


rash resolved  


clinically stable and with significant improvemnet





Objective





Last 24 Hour Vital Signs








  Date Time  Temp Pulse Resp B/P (MAP) Pulse Ox O2 Delivery O2 Flow Rate FiO2


 


6/15/20 04:00 98.5 85 19 96/61 (73) 99   


 


6/15/20 00:00 100.4 93 19 109/62 (78) 97   


 


6/14/20 21:00      Room Air  


 


6/14/20 20:00 100.0 96 19 100/58 (72) 100   


 


6/14/20 16:00 99.2 95 16 93/62 (72) 98   


 


6/14/20 12:00 99.9 86 16 98/61 (73) 97   

















Intake and Output  


 


 6/14/20 6/15/20





 19:00 07:00


 


Intake Total 1900 ml 1240 ml


 


Balance 1900 ml 1240 ml


 


  


 


Intake Oral 800 ml 240 ml


 


IV Total 1100 ml 1000 ml


 


# Voids 3 3








Objective


General Appearance:  WD/WN, no apparent distress


Lines, tubes and drains:  peripheral


HEENT:  normocephalic, atraumatic, anicteric, mucous membranes moist, PERRL


Neck:  non-tender, normal alignment, supple


Respiratory/Chest:  lungs clear, normal breath sounds, no respiratory distress, 

no accessory muscle use


Cardiovascular/Chest:  normal rate


Abdomen:  normal bowel sounds, soft , nontender


Extremities:  normal range of motion, non-tender, no calf tenderness, normal 

capillary refill, no edema


Neurologic:  alert, oriented x 3, responsive


Musculoskeletal:  normal muscle bulk 


SKIN: maculopapular rash upper chest and upper back


Laboratory Tests


6/15/20 05:30: 


White Blood Count 13.3H, Red Blood Count 4.21, Hemoglobin 11.4L, Hematocrit 

36.6L, Mean Corpuscular Volume 87, Mean Corpuscular Hemoglobin 27.0, Mean 

Corpuscular Hemoglobin Concent 31.1L, Red Cell Distribution Width 13.7, 

Platelet Count 249, Mean Platelet Volume 7.7, Neutrophils (%) (Auto) 77.7H, 

Lymphocytes (%) (Auto) 12.2L, Monocytes (%) (Auto) 5.5, Eosinophils (%) (Auto) 

3.8H, Basophils (%) (Auto) 0.8, Sodium Level 137, Potassium Level 3.8, Chloride 

Level 103, Carbon Dioxide Level 29, Anion Gap 6, Blood Urea Nitrogen 3L, 

Creatinine 0.9, Estimat Glomerular Filtration Rate > 60, Glucose Level 104, 

Calcium Level 8.2L





Current Medications








 Medications


  (Trade)  Dose


 Ordered  Sig/Farhana


 Route


 PRN Reason  Start Time


 Stop Time Status Last Admin


Dose Admin


 


 Acetaminophen


  (Tylenol)  650 mg  Q8HR  PRN


 ORAL


 FEVER  6/14/20 08:30


 7/14/20 08:29  6/15/20 09:11


 


 


 Amoxicillin/


 Clavulanate


 Potassium


  (Augmentin)  875 mg  EVERY 12  HOURS


 ORAL


   6/14/20 10:00


 6/21/20 09:59  6/15/20 09:10


 


 


 Bisacodyl


  (Dulcolax)  5 mg  DAILYPRN  PRN


 ORAL


 Constipation  6/12/20 16:30


 9/10/20 16:29  6/13/20 16:59


 


 


 Bisacodyl


  (Dulcolax)  10 mg  DAILYPRN  PRN


 RECTAL


 Constipation  6/12/20 16:30


 9/10/20 16:29   


 


 


 Diphenhydramine


 HCl


  (Benadryl)  25 mg  Q3H  PRN


 IVP


 Itching  6/12/20 10:00


 7/12/20 09:59  6/13/20 06:31


 


 


 Docusate Sodium


  (Colace)  100 mg  TWICE A  DAY


 ORAL


   6/12/20 18:00


 7/12/20 17:59  6/15/20 09:10


 


 


 Lactulose


  (Cephulac)  30 gm  TIDPRN  PRN


 ORAL


 CONSTIPATION  6/13/20 15:52


 7/13/20 15:51   


 


 


 Morphine Sulfate


  (Morphine


 Sulfate)  4 mg  Q4H  PRN


 IVP


 breakthrough pain  6/13/20 09:45


 6/20/20 09:44  6/13/20 17:40


 


 


 Ondansetron HCl


  (Zofran)  4 mg  Q6H  PRN


 IVP


 Nausea & Vomiting  6/11/20 23:15


 7/11/20 23:14   


 


 


 Oxycodone/


 Acetaminophen


  (Percocet 10/325)  1 tab  Q3H  PRN


 ORAL


 severe pain  6/13/20 09:45


 6/20/20 09:44  6/14/20 22:42


 


 


 Potassium


 Chloride 20 meq/


 Dextrose/Lactated


 Ringer's  1,010 ml @ 


 100 mls/hr  Q10H6M


 IV


   6/13/20 12:00


 7/13/20 11:59  6/15/20 02:01


 











Assessment/Plan


Assessment/Plan


ASSESSMENT


Postoperative pain


s/p uterine artery embolization 


Uterine fibroids


Leukocytosis  


Pruritic rash 


Anemia


Bradycardia - asymptomatic. resolved


 


PLAN OF CARE


MS floor


IVF


initially empiric abx -Cefazolin, 


abx stopped due to rash -possible adverse effect 


mild leukocytosis resolved ,this am WBC 14 and fever 101.2


continue Augmentin


antipyretic prn


IS the bedside and encourage to use


OOB as tolerated 





pain management, tolerates po 


Dilaudid prior dc due to possible adverse effect due to rash 


no further rash  


a/emetic prn  


Benadryl added for pruritis prn 


advance diet  to soft ,  tolerates


 


bowel regimen, had BM


voiding w/out difficulties 


supportive care  


surgeon cleared for dc dc today 


went over medications upon dc  ( has from dr Alvarado) 


dc instruction provided 





case discussed and evaluated by supervising physician











Flor Hamlin NP Dereck 15, 2020 10:10

## 2020-06-15 NOTE — NUR
Patient was discharged safely accompanied by friend and private vehicle. I have discussed 
the discharge instructions with the patient and gave her educational printed materials. she 
verbalized understanding for all medications and her disease process. I removed the 
patient's IV access and took the patient down in a wheelchair to the private car with her 
friend. Patient stable.

## 2020-06-15 NOTE — NUR
*-* INSURANCE *-*



ALL CLINICALS HAVE BEEN FAXED TO:



[employer] 

AUTH#[id number]

P:

F:[phone]

## 2020-06-15 NOTE — NUR
nurse's notes: c/o of vaginal itching and rashes; no rashes found; labia appears to be 
swollen; advised patient to refrain from using toilet paper and to use soft wipes instead; 
wash area with warm water and keep area dry; clean towels given; offered benadryl but 
patient declined; low grade temp noted at the start of shift; afebrile now; surgical site on 
the right groin remains free of infection; tegaderm intact; pain managed well with ordered 
medication with good results.

## 2020-06-16 NOTE — DISCHARGE SUMMARY
Discharge Summary


Discharge Summary


_


DATE OF ADMISSION: 6/11/2020





DATE OF DISCHARGE:  6/15/2020








DISCHARGED BY: Dr. Islas





REASON FOR ADMISSION: 


35 years old female with past medical history of uterine fibroids, status post 

uterine artery embolization, presented with persistent abdominal pain , 

radiating to her back and rated 8 out of 10 on a scale 1-10.  


Patient was sent for pain control.  


No fever or chills.  


No nausea and vomiting.  


Urinalysis revealed no evidence of urinary tract infection.  


Stable labs.  


In emergency department patient received analgesic, antiemetic, started on IV 

fluids and admitted for further management.





CONSULTANTS:


surgery Dr Alvarado 


 


[facility] COURSE: 


Patient admitted to medical surgical floor.  


Patient was provided with IV fluids and empiric antibiotics.  


Patient initially started on clear liquid diet.  


Bowel regimen instituted.  


Pain management was addressed.  


Mobilization out of bed was encouraged.  


Incentive spirometry was encouraged while in the bed.  


Patient developed pruritic rash.   


Subsequently antibiotic  was stopped for possible side effect.  


Benadryl was added to medication regimen.  


Patient continued to have rash and subsequently Dilaudid was stopped , and 

analgesic  was changed to morphine.  


Rash cleared . No  further complaint of pruritic rash.  


Patient was  on oral antibiotics . 


Hemoglobin and hematocrit remained stable. 


In fact hemoglobin improved from admission to 11.4 hematocrit 36.6 


Fevers resolved,  mild leukocytosis, trending down  .


Pain controlled with oral analgesic.  


Patient voided without difficulties.  


Diet was advanced to regular as tolerated , and patient was able  tolerate 

diet.  


Surgeon cleared patient for discharge.  


Discharge instruction provided.


Patient was instructed  to continue medications given to her at outpatient 

surgery center.  


Patient to complete antibiotic.  Patient had analgesic and antiemetic as well.  


Patient clinically stabilized and was ready for discharge





FINAL DIAGNOSES: 


Postoperative pain


Status post uterine artery embolization


Uterine fibroid  


Leukocytosis


Pruritic rash


Anemia


 


DISCHARGE MEDICATIONS:


See Medication Reconciliation list.





DISCHARGE INSTRUCTIONS:


Patient was discharged home.  


Outpatient follow-up with a surgeon as advised











Flor Hamlin NP Jun 16, 2020 15:03

## 2020-06-17 NOTE — NUR
*-* INSURANCE *-*



DISCHARGE SUMMARY HAS BEEN FAXED:



[employer] 

AUTH#[id number]

P:[phone]

F:[phone]